# Patient Record
Sex: MALE
[De-identification: names, ages, dates, MRNs, and addresses within clinical notes are randomized per-mention and may not be internally consistent; named-entity substitution may affect disease eponyms.]

---

## 2022-09-26 ENCOUNTER — NURSE TRIAGE (OUTPATIENT)
Dept: OTHER | Facility: CLINIC | Age: 62
End: 2022-09-26

## 2022-09-26 NOTE — TELEPHONE ENCOUNTER
Caller has questions regarding hemorrhoids and witch hazel. Pt has htn with [facility]por[facility].      RN utilize UpToDate for information on witch hazel - No adverse BP reactions noted, Pt advised.      Reason for Disposition   Caller wants to use a complementary or alternative medicine    Protocols used: Medication Question Call-ADULT-